# Patient Record
Sex: MALE | Race: WHITE | Employment: FULL TIME | URBAN - METROPOLITAN AREA
[De-identification: names, ages, dates, MRNs, and addresses within clinical notes are randomized per-mention and may not be internally consistent; named-entity substitution may affect disease eponyms.]

---

## 2023-09-26 ENCOUNTER — HOSPITAL ENCOUNTER (EMERGENCY)
Facility: HOSPITAL | Age: 28
Discharge: HOME | End: 2023-09-26
Attending: EMERGENCY MEDICINE
Payer: COMMERCIAL

## 2023-09-26 VITALS
DIASTOLIC BLOOD PRESSURE: 97 MMHG | RESPIRATION RATE: 20 BRPM | OXYGEN SATURATION: 98 % | SYSTOLIC BLOOD PRESSURE: 122 MMHG | HEART RATE: 74 BPM | WEIGHT: 175 LBS | TEMPERATURE: 98 F

## 2023-09-26 DIAGNOSIS — K62.5 RECTAL BLEEDING: ICD-10-CM

## 2023-09-26 DIAGNOSIS — K64.9 HEMORRHOIDS, UNSPECIFIED HEMORRHOID TYPE: Primary | ICD-10-CM

## 2023-09-26 LAB
BASOPHILS # BLD AUTO: 0.02 X10(3) UL (ref 0–0.2)
BASOPHILS NFR BLD AUTO: 0.5 %
DEPRECATED RDW RBC AUTO: 40.3 FL (ref 35.1–46.3)
EOSINOPHIL # BLD AUTO: 0.06 X10(3) UL (ref 0–0.7)
EOSINOPHIL NFR BLD AUTO: 1.4 %
ERYTHROCYTE [DISTWIDTH] IN BLOOD BY AUTOMATED COUNT: 12.4 % (ref 11–15)
HCT VFR BLD AUTO: 41.5 %
HGB BLD-MCNC: 13.8 G/DL
IMM GRANULOCYTES # BLD AUTO: 0 X10(3) UL (ref 0–1)
IMM GRANULOCYTES NFR BLD: 0 %
LYMPHOCYTES # BLD AUTO: 1.75 X10(3) UL (ref 1–4)
LYMPHOCYTES NFR BLD AUTO: 40.8 %
MCH RBC QN AUTO: 29.3 PG (ref 26–34)
MCHC RBC AUTO-ENTMCNC: 33.3 G/DL (ref 31–37)
MCV RBC AUTO: 88.1 FL
MONOCYTES # BLD AUTO: 0.45 X10(3) UL (ref 0.1–1)
MONOCYTES NFR BLD AUTO: 10.5 %
NEUTROPHILS # BLD AUTO: 2.01 X10 (3) UL (ref 1.5–7.7)
NEUTROPHILS # BLD AUTO: 2.01 X10(3) UL (ref 1.5–7.7)
NEUTROPHILS NFR BLD AUTO: 46.8 %
PLATELET # BLD AUTO: 251 10(3)UL (ref 150–450)
RBC # BLD AUTO: 4.71 X10(6)UL
WBC # BLD AUTO: 4.3 X10(3) UL (ref 4–11)

## 2023-09-26 PROCEDURE — 99283 EMERGENCY DEPT VISIT LOW MDM: CPT

## 2023-09-26 PROCEDURE — 85025 COMPLETE CBC W/AUTO DIFF WBC: CPT | Performed by: EMERGENCY MEDICINE

## 2023-09-26 PROCEDURE — 99284 EMERGENCY DEPT VISIT MOD MDM: CPT

## 2023-09-26 PROCEDURE — 36415 COLL VENOUS BLD VENIPUNCTURE: CPT

## 2023-09-26 RX ORDER — HYDROCORTISONE 25 MG/G
1 CREAM TOPICAL 2 TIMES DAILY
Qty: 28 G | Refills: 0 | Status: SHIPPED | OUTPATIENT
Start: 2023-09-26

## 2023-09-26 RX ORDER — POLYETHYLENE GLYCOL 3350 17 G/17G
17 POWDER, FOR SOLUTION ORAL DAILY PRN
Qty: 14 EACH | Refills: 0 | Status: SHIPPED | OUTPATIENT
Start: 2023-09-26 | End: 2023-10-10

## 2023-09-26 NOTE — ED INITIAL ASSESSMENT (HPI)
Patient arrives to ER for evaluation of rectal bleeding. Patient states he has known hemorrhoids and thought it had resolved but today experiencing significant bleeding to the point through his pants.

## 2024-05-20 ENCOUNTER — APPOINTMENT (OUTPATIENT)
Dept: GENERAL RADIOLOGY | Facility: HOSPITAL | Age: 29
End: 2024-05-20
Attending: EMERGENCY MEDICINE

## 2024-05-20 ENCOUNTER — APPOINTMENT (OUTPATIENT)
Dept: CT IMAGING | Facility: HOSPITAL | Age: 29
End: 2024-05-20
Attending: EMERGENCY MEDICINE

## 2024-05-20 ENCOUNTER — HOSPITAL ENCOUNTER (EMERGENCY)
Facility: HOSPITAL | Age: 29
Discharge: HOME | End: 2024-05-20
Attending: EMERGENCY MEDICINE

## 2024-05-20 VITALS
RESPIRATION RATE: 36 BRPM | OXYGEN SATURATION: 96 % | HEART RATE: 115 BPM | HEIGHT: 72 IN | WEIGHT: 160 LBS | TEMPERATURE: 97 F | BODY MASS INDEX: 21.67 KG/M2 | DIASTOLIC BLOOD PRESSURE: 86 MMHG | SYSTOLIC BLOOD PRESSURE: 126 MMHG

## 2024-05-20 DIAGNOSIS — R00.0 SINUS TACHYCARDIA: ICD-10-CM

## 2024-05-20 DIAGNOSIS — J21.9 ACUTE BRONCHIOLITIS DUE TO UNSPECIFIED ORGANISM: ICD-10-CM

## 2024-05-20 DIAGNOSIS — R07.89 CHEST TIGHTNESS: Primary | ICD-10-CM

## 2024-05-20 LAB
ANION GAP SERPL CALC-SCNC: 7 MMOL/L (ref 0–18)
ANION GAP SERPL CALC-SCNC: 9 MMOL/L (ref 0–18)
BASOPHILS # BLD AUTO: 0.01 X10(3) UL (ref 0–0.2)
BASOPHILS NFR BLD AUTO: 0.3 %
BNP SERPL-MCNC: <2 PG/ML
BUN BLD-MCNC: 15 MG/DL (ref 9–23)
BUN BLD-MCNC: 15 MG/DL (ref 9–23)
BUN/CREAT SERPL: 17.2 (ref 10–20)
CALCIUM BLD-MCNC: 8.4 MG/DL (ref 8.7–10.4)
CALCIUM BLD-MCNC: 8.6 MG/DL (ref 8.7–10.4)
CHLORIDE SERPL-SCNC: 104 MMOL/L (ref 98–112)
CHLORIDE SERPL-SCNC: 106 MMOL/L (ref 98–112)
CO2 SERPL-SCNC: 24 MMOL/L (ref 21–32)
CO2 SERPL-SCNC: 27 MMOL/L (ref 21–32)
CREAT BLD-MCNC: 0.87 MG/DL
CREAT BLD-MCNC: 0.99 MG/DL
DEPRECATED RDW RBC AUTO: 40.1 FL (ref 35.1–46.3)
EGFRCR SERPLBLD CKD-EPI 2021: 107 ML/MIN/1.73M2 (ref 60–?)
EGFRCR SERPLBLD CKD-EPI 2021: 121 ML/MIN/1.73M2 (ref 60–?)
EOSINOPHIL # BLD AUTO: 0.05 X10(3) UL (ref 0–0.7)
EOSINOPHIL NFR BLD AUTO: 1.4 %
ERYTHROCYTE [DISTWIDTH] IN BLOOD BY AUTOMATED COUNT: 12.9 % (ref 11–15)
GLUCOSE BLD-MCNC: 81 MG/DL (ref 70–99)
GLUCOSE BLD-MCNC: 90 MG/DL (ref 70–99)
HCT VFR BLD AUTO: 36.3 %
HGB BLD-MCNC: 12.3 G/DL
IMM GRANULOCYTES # BLD AUTO: 0 X10(3) UL (ref 0–1)
IMM GRANULOCYTES NFR BLD: 0 %
LYMPHOCYTES # BLD AUTO: 1.48 X10(3) UL (ref 1–4)
LYMPHOCYTES NFR BLD AUTO: 42.4 %
MCH RBC QN AUTO: 29.2 PG (ref 26–34)
MCHC RBC AUTO-ENTMCNC: 33.9 G/DL (ref 31–37)
MCV RBC AUTO: 86.2 FL
MONOCYTES # BLD AUTO: 0.53 X10(3) UL (ref 0.1–1)
MONOCYTES NFR BLD AUTO: 15.2 %
NEUTROPHILS # BLD AUTO: 1.42 X10 (3) UL (ref 1.5–7.7)
NEUTROPHILS # BLD AUTO: 1.42 X10(3) UL (ref 1.5–7.7)
NEUTROPHILS NFR BLD AUTO: 40.7 %
OSMOLALITY SERPL CALC.SUM OF ELEC: 288 MOSM/KG (ref 275–295)
PLATELET # BLD AUTO: 227 10(3)UL (ref 150–450)
POTASSIUM SERPL-SCNC: 3.8 MMOL/L (ref 3.5–5.1)
POTASSIUM SERPL-SCNC: 4 MMOL/L (ref 3.5–5.1)
RBC # BLD AUTO: 4.21 X10(6)UL
SODIUM SERPL-SCNC: 138 MMOL/L (ref 136–145)
SODIUM SERPL-SCNC: 139 MMOL/L (ref 136–145)
TROPONIN I SERPL HS-MCNC: <3 NG/L
WBC # BLD AUTO: 3.5 X10(3) UL (ref 4–11)

## 2024-05-20 PROCEDURE — 84484 ASSAY OF TROPONIN QUANT: CPT | Performed by: EMERGENCY MEDICINE

## 2024-05-20 PROCEDURE — 96375 TX/PRO/DX INJ NEW DRUG ADDON: CPT

## 2024-05-20 PROCEDURE — 85025 COMPLETE CBC W/AUTO DIFF WBC: CPT | Performed by: EMERGENCY MEDICINE

## 2024-05-20 PROCEDURE — 99285 EMERGENCY DEPT VISIT HI MDM: CPT

## 2024-05-20 PROCEDURE — 96361 HYDRATE IV INFUSION ADD-ON: CPT

## 2024-05-20 PROCEDURE — 96374 THER/PROPH/DIAG INJ IV PUSH: CPT

## 2024-05-20 PROCEDURE — 93005 ELECTROCARDIOGRAM TRACING: CPT

## 2024-05-20 PROCEDURE — 93010 ELECTROCARDIOGRAM REPORT: CPT

## 2024-05-20 PROCEDURE — 71260 CT THORAX DX C+: CPT | Performed by: EMERGENCY MEDICINE

## 2024-05-20 PROCEDURE — 80048 BASIC METABOLIC PNL TOTAL CA: CPT | Performed by: EMERGENCY MEDICINE

## 2024-05-20 PROCEDURE — 83880 ASSAY OF NATRIURETIC PEPTIDE: CPT | Performed by: EMERGENCY MEDICINE

## 2024-05-20 PROCEDURE — 94640 AIRWAY INHALATION TREATMENT: CPT

## 2024-05-20 RX ORDER — KETOROLAC TROMETHAMINE 15 MG/ML
15 INJECTION, SOLUTION INTRAMUSCULAR; INTRAVENOUS ONCE
Status: COMPLETED | OUTPATIENT
Start: 2024-05-20 | End: 2024-05-20

## 2024-05-20 RX ORDER — IPRATROPIUM BROMIDE AND ALBUTEROL SULFATE 2.5; .5 MG/3ML; MG/3ML
3 SOLUTION RESPIRATORY (INHALATION) ONCE
Status: COMPLETED | OUTPATIENT
Start: 2024-05-20 | End: 2024-05-20

## 2024-05-20 RX ORDER — METHYLPREDNISOLONE 4 MG/1
TABLET ORAL
Qty: 1 EACH | Refills: 0 | Status: SHIPPED | OUTPATIENT
Start: 2024-05-20

## 2024-05-20 RX ORDER — ALBUTEROL SULFATE 90 UG/1
AEROSOL, METERED RESPIRATORY (INHALATION) EVERY 6 HOURS PRN
COMMUNITY

## 2024-05-20 RX ORDER — METHYLPREDNISOLONE SODIUM SUCCINATE 125 MG/2ML
60 INJECTION, POWDER, LYOPHILIZED, FOR SOLUTION INTRAMUSCULAR; INTRAVENOUS ONCE
Status: COMPLETED | OUTPATIENT
Start: 2024-05-20 | End: 2024-05-20

## 2024-05-20 NOTE — ED PROVIDER NOTES
Patient Seen in: [facility] Emergency Department      History     Chief Complaint   Patient presents with    Difficulty Breathing    Chest Pain Angina     Stated Complaint: Cough x 1 month    Subjective:   HPI    21-year-old male with history of asthma and DVT thought to be secondary to COVID no longer on anticoagulation presents for evaluation of shortness of breath.  Patient reports for the past 4 weeks he has had dry cough and shortness of breath that is worse with exertion.  Patient reports feeling pain in his right thoracic back as well as chest tightness.  No recent fever, lower extremity swelling, nausea, vomiting.  Daily marijuana smoker but reports he has been smoking less due to the shortness of breath.  He has been using albuterol without relief.    Objective:   Past Medical History:    Asthma (HCC)              History reviewed. No pertinent surgical history.             Social History     Socioeconomic History    Marital status: Single   Tobacco Use    Smoking status: Never    Smokeless tobacco: Never   Substance and Sexual Activity    Alcohol use: Not Currently    Drug use: Yes     Types: Cannabis              Review of Systems    Positive for stated complaint: Cough x 1 month  Other systems are as noted in HPI.  Constitutional and vital signs reviewed.      All other systems reviewed and negative except as noted above.    Physical Exam     ED Triage Vitals [05/20/24 1702]   /85   Pulse (!) 129   Resp 22   Temp 96.9 °F (36.1 °C)   Temp src Temporal   SpO2 95 %   O2 Device None (Room air)       Current Vitals:   Vital Signs  BP: 120/79  Pulse: 114  Resp: (!) 34  Temp: 96.9 °F (36.1 °C)  Temp src: Temporal  MAP (mmHg): 90    Oxygen Therapy  SpO2: 95 %  O2 Device: None (Room air)            Physical Exam  Vitals and nursing note reviewed.   Constitutional:       General: He is not in acute distress.     Appearance: He is well-developed.   HENT:      Head: Normocephalic and atraumatic.       Mouth/Throat:      Pharynx: Uvula midline. Posterior oropharyngeal erythema present. No oropharyngeal exudate or uvula swelling.      Tonsils: No tonsillar exudate or tonsillar abscesses.   Eyes:      Conjunctiva/sclera: Conjunctivae normal.   Cardiovascular:      Rate and Rhythm: Normal rate and regular rhythm.      Heart sounds: Normal heart sounds.   Pulmonary:      Effort: Pulmonary effort is normal. No respiratory distress.      Breath sounds: Decreased breath sounds present.   Abdominal:      General: Bowel sounds are normal.      Palpations: Abdomen is soft.      Tenderness: There is no abdominal tenderness.   Musculoskeletal:         General: Normal range of motion.      Cervical back: Normal range of motion and neck supple.   Skin:     General: Skin is warm and dry.      Findings: No rash.   Neurological:      General: No focal deficit present.      Mental Status: He is alert and oriented to person, place, and time.               ED Course     Labs Reviewed   BASIC METABOLIC PANEL (8) - Abnormal; Notable for the following components:       Result Value    Calcium, Total 8.6 (*)     All other components within normal limits   BASIC METABOLIC PANEL (8) - Abnormal; Notable for the following components:    Calcium, Total 8.4 (*)     All other components within normal limits   CBC W/ DIFFERENTIAL - Abnormal; Notable for the following components:    WBC 3.5 (*)     RBC 4.21 (*)     HGB 12.3 (*)     HCT 36.3 (*)     Neutrophil Absolute Prelim 1.42 (*)     Neutrophil Absolute 1.42 (*)     All other components within normal limits   TROPONIN I HIGH SENSITIVITY - Normal   PRO BETA NATRIURETIC PEPTIDE - Normal   REDRAW BMP - Normal   CBC WITH DIFFERENTIAL WITH PLATELET    Narrative:     The following orders were created for panel order CBC With Differential With Platelet.  Procedure                               Abnormality         Status                     ---------                               -----------          ------                     CBC W/ DIFFERENTIAL[[id number]]          Abnormal            Final result                 Please view results for these tests on the individual orders.     EKG    Rate, intervals and axes as noted on EKG Report.  Rate: 121  Rhythm: Sinus Rhythm  Reading: Sinus tachycardia, no STEMI         CT CHEST PE AORTA (IV ONLY) (CPT=71260)    Result Date: 5/20/2024  CONCLUSION:   1. No acute pulmonary embolism through the subsegmental pulmonary arteries. 2. Multiple areas of ground-glass opacities and clustered micronodules involving all 5 lobes, which likely reflects infectious/inflammatory bronchiolitis.  Consider follow-up CT chest in 4-6 weeks to monitor for resolution. 3. Diffuse small airways disease. 4. Mildly enlarged mediastinal and hilar lymph nodes, which are likely reactive. 5. Lesser incidental findings described above.    Dictated by (CST): Clifton Alberts MD on 5/20/2024 at 6:47 PM     Finalized by (CST): Clifton Alberts MD on 5/20/2024 at 6:55 PM                MDM               Medical Decision Making  Differential diagnosis includes but is not limited to asthma exacerbation, bronchitis, pleural effusion, pneumothorax, pneumonia, cardiomyopathy, heart failure, PE, anemia, aortic aneurysm or dissection, coronary artery dissection    Patient with neg troponin and BNP, CT without any evidence of PE, does note inflammation.  Patient oxygenating normally on room air, noted to be tachypneic but in no respiratory distress, reports he feels better taking smaller quicker breaths.  Minimal improvement in heart rate after IV fluids, steroids, Toradol.  Discussed option of admission versus outpatient follow-up, patient prefers discharge with outpatient follow-up, discussed return precautions.  [employer] message sent to Dr. Bartholomew regarding plan for outpatient follow-up, discussed again with patient importance of close outpatient follow-up as well as strict return precautions, he verbalizes  understanding of and agreement with this plan.    Problems Addressed:  Acute bronchiolitis due to unspecified organism: acute illness or injury  Chest tightness: acute illness or injury  Sinus tachycardia: acute illness or injury    Amount and/or Complexity of Data Reviewed  External Data Reviewed: labs.     Details: CBC stable compared to 9/26/2023  Labs: ordered.  Radiology: ordered and independent interpretation performed.     Details: CT image reviewed, no obvious pneumothorax, other and incidental findings deferred to radiology  ECG/medicine tests: ordered and independent interpretation performed. Decision-making details documented in ED Course.  Discussion of management or test interpretation with external provider(s): [email or web address] message sent to cardiology    Risk  Prescription drug management.        Disposition and Plan     Clinical Impression:  1. Chest tightness    2. Acute bronchiolitis due to unspecified organism    3. Sinus tachycardia         Disposition:  Discharge  5/20/2024 10:30 pm    Follow-up:  Krish Bartholomew MD  [address] [facility] [address]  [facility] [id number] [phone]    Schedule an appointment as soon as possible for a visit in 2 day(s)  For follow up    [address] [id number] [phone]  Schedule an appointment as soon as possible for a visit in 1 week(s)  For follow up    Arun Urrutia MD  [address] [facility] [address]  [facility] [id number] [phone]    Schedule an appointment as soon as possible for a visit in 1 week(s)  For follow up          Medications Prescribed:  Current Discharge Medication List        START taking these medications    Details   methylPREDNISolone 4 MG Oral Tablet Therapy Pack Dosepack: use as directed on packaging  Qty: 1 each, Refills: 0

## 2024-05-20 NOTE — ED INITIAL ASSESSMENT (HPI)
Pt arrives ambulatory to ED for SOB/PRIYA, chest tightness x 4 weeks. Pt rates chest tightness 8/10. Pt states increased Sob from activity. Pt states inhaler used today x 4-5 times. Aox4,speaking in full sentences.

## 2024-05-21 LAB
ATRIAL RATE: 121 BPM
P AXIS: 66 DEGREES
P-R INTERVAL: 132 MS
Q-T INTERVAL: 286 MS
QRS DURATION: 82 MS
QTC CALCULATION (BEZET): 406 MS
R AXIS: 55 DEGREES
T AXIS: 52 DEGREES
VENTRICULAR RATE: 121 BPM

## 2024-05-21 NOTE — ED QUICK NOTES
Pt discharged to home.  Instructed on the importance of follow-up with referral provided, take any prescribed medications as instructed, use OTC tylenol/motrin for fever/pain, drink plenty of fluids, and return sooner with any worsening of symptoms.  All questions answered prior to disposition.  Pt ambulatory out of the ER with steady gait.

## 2024-05-30 ENCOUNTER — LAB REQUISITION (OUTPATIENT)
Dept: LAB | Age: 29
End: 2024-05-30

## 2024-05-30 PROCEDURE — PSEU9015 PNEUMOCYSTIS EXAM: Performed by: CLINICAL MEDICAL LABORATORY

## 2024-05-30 PROCEDURE — 87281 PNEUMOCYSTIS CARINII AG IF: CPT | Performed by: CLINICAL MEDICAL LABORATORY

## 2024-05-31 LAB — MS NITRATE STN SPEC: POSITIVE

## (undated) NOTE — LETTER
Date & Time: 9/26/2023, 4:58 PM  Patient: Dirk Velasco  Encounter Provider(s):    Kolby Woods MD       To Whom It May Concern:    Dirk Velasco was seen and treated in our department on 9/26/2023. He should not return to work until 9/28/2023 .     If you have any questions or concerns, please do not hesitate to call.        _____________________________  Physician/APC Signature